# Patient Record
Sex: MALE | Race: WHITE | NOT HISPANIC OR LATINO | Employment: UNEMPLOYED | URBAN - METROPOLITAN AREA
[De-identification: names, ages, dates, MRNs, and addresses within clinical notes are randomized per-mention and may not be internally consistent; named-entity substitution may affect disease eponyms.]

---

## 2024-08-30 ENCOUNTER — TELEPHONE (OUTPATIENT)
Dept: TRANSPLANT | Facility: HOSPITAL | Age: 34
End: 2024-08-30

## 2024-09-03 ENCOUNTER — APPOINTMENT (OUTPATIENT)
Dept: CARDIOLOGY | Facility: CLINIC | Age: 34
End: 2024-09-03

## 2024-09-03 VITALS
HEIGHT: 76 IN | DIASTOLIC BLOOD PRESSURE: 74 MMHG | BODY MASS INDEX: 38.36 KG/M2 | HEART RATE: 67 BPM | SYSTOLIC BLOOD PRESSURE: 115 MMHG | WEIGHT: 315 LBS | OXYGEN SATURATION: 98 %

## 2024-09-03 DIAGNOSIS — I42.8 NONISCHEMIC CARDIOMYOPATHY (MULTI): ICD-10-CM

## 2024-09-03 DIAGNOSIS — I50.20 ACC/AHA STAGE C SYSTOLIC HEART FAILURE (MULTI): Primary | ICD-10-CM

## 2024-09-03 DIAGNOSIS — K76.0 NAFLD (NONALCOHOLIC FATTY LIVER DISEASE): ICD-10-CM

## 2024-09-03 DIAGNOSIS — E66.01 MORBID OBESITY WITH BMI OF 45.0-49.9, ADULT (MULTI): ICD-10-CM

## 2024-09-03 PROCEDURE — 1036F TOBACCO NON-USER: CPT | Performed by: INTERNAL MEDICINE

## 2024-09-03 PROCEDURE — 3008F BODY MASS INDEX DOCD: CPT | Performed by: INTERNAL MEDICINE

## 2024-09-03 PROCEDURE — 99205 OFFICE O/P NEW HI 60 MIN: CPT | Performed by: INTERNAL MEDICINE

## 2024-09-03 RX ORDER — METOPROLOL SUCCINATE 25 MG/1
25 TABLET, EXTENDED RELEASE ORAL DAILY
COMMUNITY

## 2024-09-03 RX ORDER — ESOMEPRAZOLE MAGNESIUM 40 MG/1
40 CAPSULE, DELAYED RELEASE ORAL
COMMUNITY

## 2024-09-03 RX ORDER — DAPAGLIFLOZIN 10 MG/1
10 TABLET, FILM COATED ORAL EVERY 24 HOURS
COMMUNITY

## 2024-09-03 RX ORDER — ASPIRIN 81 MG/1
81 TABLET ORAL DAILY
COMMUNITY

## 2024-09-03 RX ORDER — SPIRONOLACTONE 25 MG/1
25 TABLET ORAL DAILY
COMMUNITY

## 2024-09-03 RX ORDER — FUROSEMIDE 20 MG/1
20 TABLET ORAL DAILY
COMMUNITY

## 2024-09-03 RX ORDER — SACUBITRIL AND VALSARTAN 24; 26 MG/1; MG/1
1 TABLET, FILM COATED ORAL 2 TIMES DAILY
COMMUNITY
End: 2024-09-03 | Stop reason: DRUGHIGH

## 2024-09-03 NOTE — PATIENT INSTRUCTIONS
"It was a pleasure seeing you today. Please contact myself or my team with any questions.     To reach Dr. Adhikari' office please call [phone] (Kim).   Fax: [phone]   To schedule an appointment call [phone]     If you have any questions or need cardiac medication refills, please call the Heart Failure office at [phone], option 6. You may also contact the  Heart Failure Nursing team via email at [email or web address] (Please include your name and date of birth).        1) Change lasix to \"as needed\"  2) Increase entresto to 49/51 mg twice a day  3) Labs in 7-10 days (RFP/BNP)  4) Monitor vitals (heart rate and blood pressure and call or email in 2 weeks)  5) We will refer you to cardiac rehab  6) We will schedule a cardiac MRI in 2.5 months  7) Follow up in 3 months at /[facility]  "

## 2024-09-03 NOTE — PROGRESS NOTES
[facility]  Primary Care Physician: No primary care provider on file.  Referring Provider/Cardiologist: n/a     Date of Visit: 09/03/2024  1:00 PM EDT  Location of visit: [address]     HPI:   Mr. Stuart is a 34M with a PMHx sig for obesity with suspected MAFLD who was recently diagnosed with cardiogenic shock secondary to stage C systolic HF/NICM/HFrEF currently using a WCD who presents to the Advanced Heart Failure clinic to establish care.     Recently hospitalized at multiple medical centers in [place] for cardiogenic shock. Reports he was initially evaluated and treated for possible PNA with antibiotics. He even underwent a left VATs with left wedge resection for ILD evaluation. Ultimately he had further decompensation and was life-flighted to [facility] and managed for cardiogenic shock requiring a PAC and inotrope (dobutamine) in the setting of severe biventricular failure, shock liver, and SELMA.      Upon discharge, he relocated back to [place], to be back with family.    Currently denies chest pain, palpitations, shortness of breath. Patient has complaints of dyspnea on exertion. Denies orthopnea, negative PND. No edema noted in BLE. Patient denies headaches, dizziness or recent falls. Patient has complaints of lightheadedness.       Hospitalizations: August 2024 in [place] for cardiogenic shock      PMHx:  obesity with suspected MAFLD, stage C systolic HF/NICM/HFrEF currently using a WCD    SocHx:  Moved back to [place]   Denies smoking (quit last month), denies ETOH, denies illicits (last use of cocaine 3 months, last use of marijuana last month)  Ran a [email or web address] in [place]    FamHx:  No CAD/cardiomyopathy/SCD      Current Outpatient Medications   Medication Sig Dispense Refill    aspirin 81 mg EC tablet Take 1 tablet (81 mg) by mouth once daily.      dapagliflozin propanediol (Farxiga) 10 mg Take 0.5 tablets (5 mg) by mouth once every 24  "hours.      esomeprazole (NexIUM) 40 mg DR capsule Take 1 capsule (40 mg) by mouth once daily in the morning. Take before meals. Do not open capsule.      furosemide (Lasix) 20 mg tablet Take 1 tablet (20 mg) by mouth once daily.      metoprolol succinate XL (Toprol-XL) 25 mg 24 hr tablet Take 1 tablet (25 mg) by mouth once daily. Do not crush or chew.      sacubitriL-valsartan (Entresto) 24-26 mg tablet Take 1 tablet by mouth 2 times a day.      spironolactone (Aldactone) 25 mg tablet Take 1 tablet (25 mg) by mouth once daily.       No current facility-administered medications for this visit.       No Known Allergies      Visit Vitals  /74 (BP Location: Right arm, Patient Position: Sitting)   Pulse 67   Ht 1.93 m (6' 4\")   Wt (!) 171 kg (378 lb)   SpO2 98%   BMI 46.01 kg/m²   Smoking Status Former   BSA 3.03 m²        Physical Exam:  On exam Mr. Stuart appears his stated age, is alert and oriented x3, and in no acute distress. His sclera are anicteric and his oropharynx has moist mucous membranes. His neck is supple and without thyromegaly. The JVP is ~6 cm of water above the right atrium. His cardiac exam has regular rhythm, normal S1, S2. No S3/4. There are no murmurs. His lungs are clear to auscultation bilaterally and there is no dullness to percussion. His abdomen is soft, nontender with normoactive bowel sounds. There is no HJR. The extremities are warm and without edema. The skin is dry. There is no rash present. The distal pulses are 2+ in all four extremities. His mood and affect are appropriate for todays encounter.       Cardiac Labs/Diagnostics:    ECG (9/3/24):  Sinus rhythm (HR 85), RBBB    Echo (8/19/24):  LVEF 25%; LVIDD 7.6 cm, mild MR, reduced RV function    CCTA (8/15/24):  No CAD or LV thrombus    Liver u/s (8/11/24):  Hepatomegaly with findings suggesting of steatosis vs intrinsic cellular disease      Impression/Plan:  Mr. Stuart is a 34M with a PMHx sig for obesity with suspected MAFLD who " "was recently diagnosed with cardiogenic shock secondary to stage C systolic HF/NICM/HFrEF currently using a WCD who presents to the Advanced Heart Failure clinic to establish care. At the current time he has functional class II symptoms and appears euvolemic on exam.     1) Stage C acute systolic HF/NICM/HFrEF with severe LV dysfunction (LVEF 25%; 8/2024) and associated RV dysfunction currently using a WCD  Recently hospitalized in [place] for HF-CS requiring a PAC and inotropic support (dobutamine). Echo at that time with severe BiV dysfunction (LVIDD 7.6cm). Etiology unclear (no fam hx), but had a history of substance use and was treated for \"PNA\" prior to his diagnosis (so ? myocarditis). Currently on GDMT, which will need uptitrated. Also currently using a WCD with no shocks.  -c/w metoprolol succinate 25 mg daily  -increase entresto to 49/51 mg bid  -c/w farxiga 10 mg daily, spironolactone 25 mg daily  -monitor vitals and call/email in 2 weeks  -labs in 7-10 days (RFP/BNP)  -change lasix to 20 mg daily/prn  -will obtain a cMRI in 3 months to determine role for an ICD and further evaluate for etiology  -will refer to cardiac rehab    2) Obesity with suspected MAFLD  Had shock liver, but ultrasound imaging with possible steatosis.   -will reassess once cardiac status has been optimized  -encouraged ongoing weight loss      F/U: 3-4 months at /[facility]      ____________________________________________________________  Gonzalez Adhikari DO  Section of Advanced Heart Failure and Cardiac Transplantation  Division of Cardiovascular Medicine  [facility]  [facility]  "

## 2024-09-04 PROBLEM — I42.8 NONISCHEMIC CARDIOMYOPATHY (MULTI): Status: ACTIVE | Noted: 2024-09-04

## 2024-09-04 PROBLEM — I50.20 ACC/AHA STAGE C SYSTOLIC HEART FAILURE (MULTI): Status: ACTIVE | Noted: 2024-09-04

## 2024-09-04 PROBLEM — E66.01 MORBID OBESITY WITH BMI OF 45.0-49.9, ADULT (MULTI): Status: ACTIVE | Noted: 2024-09-04

## 2024-09-04 PROBLEM — K76.0 NAFLD (NONALCOHOLIC FATTY LIVER DISEASE): Status: ACTIVE | Noted: 2024-09-04

## 2024-09-20 DIAGNOSIS — I50.20 ACC/AHA STAGE C SYSTOLIC HEART FAILURE: ICD-10-CM

## 2024-09-20 RX ORDER — FUROSEMIDE 20 MG/1
20 TABLET ORAL DAILY PRN
Qty: 30 TABLET | Refills: 11 | Status: SHIPPED | OUTPATIENT
Start: 2024-09-20 | End: 2024-09-27 | Stop reason: SINTOL

## 2024-09-20 RX ORDER — DAPAGLIFLOZIN 10 MG/1
10 TABLET, FILM COATED ORAL EVERY 24 HOURS
Qty: 30 TABLET | Refills: 11 | Status: SHIPPED | OUTPATIENT
Start: 2024-09-20

## 2024-09-20 RX ORDER — SPIRONOLACTONE 25 MG/1
25 TABLET ORAL DAILY
Qty: 30 TABLET | Refills: 11 | Status: SHIPPED | OUTPATIENT
Start: 2024-09-20

## 2024-09-20 RX ORDER — METOPROLOL SUCCINATE 25 MG/1
25 TABLET, EXTENDED RELEASE ORAL DAILY
Qty: 30 TABLET | Refills: 11 | Status: SHIPPED | OUTPATIENT
Start: 2024-09-20 | End: 2024-09-27 | Stop reason: SDUPTHER

## 2024-09-27 ENCOUNTER — OFFICE VISIT (OUTPATIENT)
Dept: CARDIOLOGY | Facility: CLINIC | Age: 34
End: 2024-09-27

## 2024-09-27 ENCOUNTER — LAB (OUTPATIENT)
Dept: LAB | Facility: LAB | Age: 34
End: 2024-09-27

## 2024-09-27 VITALS
OXYGEN SATURATION: 96 % | WEIGHT: 315 LBS | HEIGHT: 76 IN | DIASTOLIC BLOOD PRESSURE: 72 MMHG | SYSTOLIC BLOOD PRESSURE: 109 MMHG | HEART RATE: 100 BPM | BODY MASS INDEX: 38.36 KG/M2

## 2024-09-27 DIAGNOSIS — I50.20 ACC/AHA STAGE C SYSTOLIC HEART FAILURE: ICD-10-CM

## 2024-09-27 DIAGNOSIS — I42.8 NONISCHEMIC CARDIOMYOPATHY (MULTI): ICD-10-CM

## 2024-09-27 DIAGNOSIS — I42.8 NONISCHEMIC CARDIOMYOPATHY (MULTI): Primary | ICD-10-CM

## 2024-09-27 LAB
ALBUMIN SERPL BCP-MCNC: 3.7 G/DL (ref 3.4–5)
ALP SERPL-CCNC: 56 U/L (ref 33–120)
ALT SERPL W P-5'-P-CCNC: 6 U/L (ref 10–52)
ANION GAP SERPL CALC-SCNC: 18 MMOL/L (ref 10–20)
AST SERPL W P-5'-P-CCNC: 11 U/L (ref 9–39)
BILIRUB SERPL-MCNC: 1.8 MG/DL (ref 0–1.2)
BUN SERPL-MCNC: 16 MG/DL (ref 6–23)
CALCIUM SERPL-MCNC: 9.1 MG/DL (ref 8.6–10.6)
CHLORIDE SERPL-SCNC: 105 MMOL/L (ref 98–107)
CO2 SERPL-SCNC: 21 MMOL/L (ref 21–32)
CREAT SERPL-MCNC: 0.89 MG/DL (ref 0.5–1.3)
EGFRCR SERPLBLD CKD-EPI 2021: >90 ML/MIN/1.73M*2
ERYTHROCYTE [DISTWIDTH] IN BLOOD BY AUTOMATED COUNT: 17.7 % (ref 11.5–14.5)
GLUCOSE SERPL-MCNC: 138 MG/DL (ref 74–99)
HCT VFR BLD AUTO: 34.7 % (ref 41–52)
HGB BLD-MCNC: 10.5 G/DL (ref 13.5–17.5)
MCH RBC QN AUTO: 24.9 PG (ref 26–34)
MCHC RBC AUTO-ENTMCNC: 30.3 G/DL (ref 32–36)
MCV RBC AUTO: 82 FL (ref 80–100)
NRBC BLD-RTO: 0 /100 WBCS (ref 0–0)
PHOSPHATE SERPL-MCNC: 4.1 MG/DL (ref 2.5–4.9)
PLATELET # BLD AUTO: 295 X10*3/UL (ref 150–450)
POTASSIUM SERPL-SCNC: 4.6 MMOL/L (ref 3.5–5.3)
PROT SERPL-MCNC: 6.5 G/DL (ref 6.4–8.2)
RBC # BLD AUTO: 4.21 X10*6/UL (ref 4.5–5.9)
SODIUM SERPL-SCNC: 139 MMOL/L (ref 136–145)
WBC # BLD AUTO: 8.7 X10*3/UL (ref 4.4–11.3)

## 2024-09-27 PROCEDURE — 99215 OFFICE O/P EST HI 40 MIN: CPT | Performed by: INTERNAL MEDICINE

## 2024-09-27 PROCEDURE — 3008F BODY MASS INDEX DOCD: CPT | Performed by: INTERNAL MEDICINE

## 2024-09-27 PROCEDURE — 83880 ASSAY OF NATRIURETIC PEPTIDE: CPT

## 2024-09-27 PROCEDURE — 99205 OFFICE O/P NEW HI 60 MIN: CPT | Performed by: INTERNAL MEDICINE

## 2024-09-27 PROCEDURE — 80053 COMPREHEN METABOLIC PANEL: CPT

## 2024-09-27 PROCEDURE — 1036F TOBACCO NON-USER: CPT | Performed by: INTERNAL MEDICINE

## 2024-09-27 PROCEDURE — 93005 ELECTROCARDIOGRAM TRACING: CPT | Performed by: INTERNAL MEDICINE

## 2024-09-27 PROCEDURE — 84100 ASSAY OF PHOSPHORUS: CPT

## 2024-09-27 PROCEDURE — 36415 COLL VENOUS BLD VENIPUNCTURE: CPT

## 2024-09-27 PROCEDURE — 85027 COMPLETE CBC AUTOMATED: CPT

## 2024-09-27 RX ORDER — METOPROLOL SUCCINATE 25 MG/1
25 TABLET, EXTENDED RELEASE ORAL 2 TIMES DAILY
Qty: 60 TABLET | Refills: 11 | Status: SHIPPED | OUTPATIENT
Start: 2024-09-27 | End: 2025-09-27

## 2024-09-27 RX ORDER — TORSEMIDE 10 MG/1
10 TABLET ORAL DAILY
Qty: 30 TABLET | Refills: 11 | Status: SHIPPED | OUTPATIENT
Start: 2024-09-27 | End: 2025-09-27

## 2024-09-27 ASSESSMENT — ENCOUNTER SYMPTOMS
DEPRESSION: 0
LOSS OF SENSATION IN FEET: 0
OCCASIONAL FEELINGS OF UNSTEADINESS: 0

## 2024-09-27 ASSESSMENT — PAIN SCALES - GENERAL: PAINLEVEL: 0-NO PAIN

## 2024-09-27 NOTE — PROGRESS NOTES
Name : Dawit Stuart    : 1990   MRN : [id number]   ENC Date : 24     Reason for visit:     Assessment and Plan:  Chronic systolic and diastolic heart failure: Patient with severely dilated left ventricle with severe by ventricular dysfunction.  Tolerated increase in Entresto well.  Will switch Lasix to torsemide to get a more consistent absorption.  I think we can safely increase his metoprolol succinate to 25 mg twice daily today.  I am hopeful we can continue to uptitrate the beta-blocker.  It is too soon to repeat LV function assessment.  He does need laboratories to recheck renal function and electrolytes.  Agree with cardiac rehab referral.  Right bundle branch block: QRS duration is quite widened to 178 ms however it is with a right bundle appearance.  Biventricular pacing is likely less effective in this case but might still offer some benefit.  Will obviously confer with Dr. Adhikari on need for ICD and for biventricular ICD implant.  Shock liver with fatty liver disease: Recheck LFTs.  Hopefully these are trending in the right direction.  Disp: RTO in 2-3 weeks      HPI:  Patient is seen by myself for the first time today.  His care was in [place] but he relocated closer to his family here in [place].  He saw Dr. Adhikari 3 weeks ago for initial advanced heart failure intake.  Patient has significant LV systolic dysfunction with EF 25% as documented by echocardiogram along with RV dysfunction.  He briefly required dobutamine support.  He states over the last 3 weeks he continues to be short of breath and has occasional cough.  His cough is quite variable.  He tried cutting back furosemide to as needed but for the most part he uses it every day and has noticed some variability in his urine output from a day-to-day basis.      Problem List:   Patient Active Problem List   Diagnosis    ACC/AHA stage C systolic heart failure (Multi)    Nonischemic cardiomyopathy (Multi)    NAFLD  (nonalcoholic fatty liver disease)    Morbid obesity with BMI of 45.0-49.9, adult (Multi)        Meds:   Current Outpatient Medications on File Prior to Visit   Medication Sig Dispense Refill    aspirin 81 mg EC tablet Take 1 tablet (81 mg) by mouth once daily.      dapagliflozin propanediol (Farxiga) 10 mg Take 1 tablet (10 mg) by mouth once every 24 hours. 30 tablet 11    esomeprazole (NexIUM) 40 mg DR capsule Take 1 capsule (40 mg) by mouth once daily in the morning. Take before meals. Do not open capsule.      sacubitriL-valsartan (Entresto) 49-51 mg tablet Take 1 tablet by mouth 2 times a day. 60 tablet 11    spironolactone (Aldactone) 25 mg tablet Take 1 tablet (25 mg) by mouth once daily. 30 tablet 11    [DISCONTINUED] furosemide (Lasix) 20 mg tablet Take 1 tablet (20 mg) by mouth once daily as needed (for weight gain or swelling). 30 tablet 11    [DISCONTINUED] metoprolol succinate XL (Toprol-XL) 25 mg 24 hr tablet Take 1 tablet (25 mg) by mouth once daily. Do not crush or chew. 30 tablet 11     No current facility-administered medications on file prior to visit.       All: No Known Allergies    Fam Hx: No family history on file.    Soc Hx:   Social History     Socioeconomic History    Marital status: Single     Spouse name: Not on file    Number of children: Not on file    Years of education: Not on file    Highest education level: Not on file   Occupational History    Not on file   Tobacco Use    Smoking status: Former     Types: Cigarettes    Smokeless tobacco: Never   Substance and Sexual Activity    Alcohol use: Never    Drug use: Never    Sexual activity: Not on file   Other Topics Concern    Not on file   Social History Narrative    Not on file     Social Determinants of Health     Financial Resource Strain: Not on file   Food Insecurity: Not on file   Transportation Needs: Not on file   Physical Activity: Not on file   Stress: Not on file   Social Connections: Not on file   Intimate Partner Violence:  "Not on file   Housing Stability: Not on file       VS: /72 (BP Location: Right arm, Patient Position: Sitting)   Pulse 100   Ht 1.93 m (6' 4\")   Wt (!) 172 kg (380 lb)   SpO2 96%   BMI 46.26 kg/m²      Physical Exam  Vitals reviewed.   Constitutional:       Appearance: Normal appearance. He is obese.   Eyes:      Pupils: Pupils are equal, round, and reactive to light.   Neck:      Vascular: No JVD.   Cardiovascular:      Rate and Rhythm: Regular rhythm. Tachycardia present.      Pulses: Normal pulses.      Heart sounds: No murmur heard.     No gallop.   Pulmonary:      Effort: No respiratory distress.      Breath sounds: No wheezing or rales.   Abdominal:      General: Abdomen is flat. There is no distension.      Palpations: Abdomen is soft.   Musculoskeletal:         General: No swelling.      Right lower leg: No edema.      Left lower leg: No edema.   Neurological:      General: No focal deficit present.      Mental Status: He is alert.   Psychiatric:         Mood and Affect: Mood normal.          ECG: Mild sinus tachycardia.  Right bundle branch block.  QRS duration of 178 ms      Bertin Husain MD   "

## 2024-09-28 LAB — BNP SERPL-MCNC: 1239 PG/ML (ref 0–99)

## 2024-10-01 ENCOUNTER — TELEPHONE (OUTPATIENT)
Dept: CARDIOLOGY | Facility: HOSPITAL | Age: 34
End: 2024-10-01

## 2024-10-01 NOTE — TELEPHONE ENCOUNTER
Please let patient know his labs look quite good.  His liver function tests have nearly normalized.  His AST and ALT are perfectly fine.  His bilirubin is only mildly elevated.  Kidney function is excellent.  Potassium level perfect.    Is patient tolerating higher dose of metoprolol?    SDH

## 2024-10-02 LAB
ATRIAL RATE: 113 BPM
P AXIS: 42 DEGREES
P OFFSET: 196 MS
P ONSET: 156 MS
PR INTERVAL: 118 MS
Q ONSET: 215 MS
QRS COUNT: 19 BEATS
QRS DURATION: 172 MS
QT INTERVAL: 400 MS
QTC CALCULATION(BAZETT): 548 MS
QTC FREDERICIA: 494 MS
R AXIS: -33 DEGREES
T AXIS: 12 DEGREES
T OFFSET: 415 MS
VENTRICULAR RATE: 113 BPM

## 2024-10-18 ENCOUNTER — OFFICE VISIT (OUTPATIENT)
Dept: CARDIOLOGY | Facility: CLINIC | Age: 34
End: 2024-10-18

## 2024-10-18 VITALS
HEART RATE: 103 BPM | DIASTOLIC BLOOD PRESSURE: 73 MMHG | BODY MASS INDEX: 38.36 KG/M2 | OXYGEN SATURATION: 97 % | SYSTOLIC BLOOD PRESSURE: 103 MMHG | HEIGHT: 76 IN | WEIGHT: 315 LBS

## 2024-10-18 DIAGNOSIS — I50.20 ACC/AHA STAGE C SYSTOLIC HEART FAILURE: ICD-10-CM

## 2024-10-18 DIAGNOSIS — I42.8 NONISCHEMIC CARDIOMYOPATHY (MULTI): ICD-10-CM

## 2024-10-18 PROCEDURE — 3008F BODY MASS INDEX DOCD: CPT | Performed by: INTERNAL MEDICINE

## 2024-10-18 PROCEDURE — 99214 OFFICE O/P EST MOD 30 MIN: CPT | Performed by: INTERNAL MEDICINE

## 2024-10-18 PROCEDURE — 1036F TOBACCO NON-USER: CPT | Performed by: INTERNAL MEDICINE

## 2024-10-18 RX ORDER — TORSEMIDE 10 MG/1
20 TABLET ORAL DAILY
Qty: 180 TABLET | Refills: 3 | Status: SHIPPED | OUTPATIENT
Start: 2024-10-18 | End: 2025-10-18

## 2024-10-18 RX ORDER — DAPAGLIFLOZIN 10 MG/1
10 TABLET, FILM COATED ORAL EVERY 24 HOURS
Qty: 30 TABLET | Refills: 11 | Status: SHIPPED | OUTPATIENT
Start: 2024-10-18

## 2024-10-18 RX ORDER — LOSARTAN POTASSIUM 100 MG/1
100 TABLET ORAL DAILY
Qty: 30 TABLET | Refills: 11 | Status: SHIPPED | OUTPATIENT
Start: 2024-10-18 | End: 2025-10-18

## 2024-10-18 ASSESSMENT — PAIN SCALES - GENERAL: PAINLEVEL_OUTOF10: 0-NO PAIN

## 2024-10-18 NOTE — PROGRESS NOTES
Name : Dawit Staurt   : 1990   MRN : [id number]   ENC Date : 10/18/2024      Assessment and Plan:  Chronic systolic heart failure: Patient tolerated increase in metoprolol reasonably well.  He still has orthopnea and PND as well as some lower extremity edema.  I increased his torsemide to 20 mg daily and instructed him on how he can adjust between 10 and 20 mg daily.  No other medication changes today.  I am hopeful we can further increase his metoprolol when I see him back in a few weeks.  We will repeat CMP and BNP prior to return.  Too soon to repeat echocardiogram.  Continue LifeVest.  Acute liver injury: Most recent LFTs actually looked quite good.  Bilirubin was still mildly increased most consistent with passive liver congestion but the remainder of his LFTs were unremarkable.  Disp: RTO in 4 weeks    HPI:  Patient returns today having tolerated the increase in metoprolol reasonably well.  He has many good days but there are some days where he drinks an excessive amount of fluid and feels short of breath the next day.  He still has some periods of orthopnea and PND but no syncopal events.  No chest pain.  No LifeVest shocks.    Problem list overview:   Patient Active Problem List   Diagnosis    ACC/AHA stage C systolic heart failure    Nonischemic cardiomyopathy (Multi)    NAFLD (nonalcoholic fatty liver disease)    Morbid obesity with BMI of 45.0-49.9, adult (Multi)       Meds:   Current Outpatient Medications on File Prior to Visit   Medication Sig Dispense Refill    aspirin 81 mg EC tablet Take 1 tablet (81 mg) by mouth once daily.      esomeprazole (NexIUM) 40 mg DR capsule Take 1 capsule (40 mg) by mouth once daily in the morning. Take before meals. Do not open capsule.      metoprolol succinate XL (Toprol-XL) 25 mg 24 hr tablet Take 1 tablet (25 mg) by mouth 2 times a day. Do not crush or chew. 60 tablet 11    spironolactone (Aldactone) 25 mg tablet Take 1 tablet (25 mg) by mouth once daily. 30  "tablet 11    [DISCONTINUED] dapagliflozin propanediol (Farxiga) 10 mg Take 1 tablet (10 mg) by mouth once every 24 hours. 30 tablet 11    [DISCONTINUED] sacubitriL-valsartan (Entresto) 49-51 mg tablet Take 1 tablet by mouth 2 times a day. 60 tablet 11    [DISCONTINUED] torsemide (Demadex) 10 mg tablet Take 1 tablet (10 mg) by mouth once daily. 30 tablet 11     No current facility-administered medications on file prior to visit.        VS:  /73 (BP Location: Right arm, Patient Position: Sitting, BP Cuff Size: Large adult)   Pulse 103   Ht 1.93 m (6' 4\")   Wt (!) 174 kg (384 lb)   SpO2 97%   BMI 46.74 kg/m²     Vitals reviewed.   Constitutional:       Appearance: Obese.   Neck:      Vascular: No JVD.   Pulmonary:      Effort: Pulmonary effort is normal.      Breath sounds: Normal breath sounds.   Cardiovascular:      Normal rate. Regular rhythm.      Murmurs: There is no murmur.      No gallop.    Pulses:     Intact distal pulses.   Edema:     Peripheral edema present.     Ankle: bilateral 1+ edema of the ankle.     Feet: bilateral 1+ edema of the feet.  Abdominal:      General: Abdomen is flat.      Palpations: Abdomen is soft.   Neurological:      General: No focal deficit present.      Mental Status: Alert.   Psychiatric:         Mood and Affect: Mood normal.         Bertin Husain MD  "

## 2024-11-22 ENCOUNTER — OFFICE VISIT (OUTPATIENT)
Dept: CARDIOLOGY | Facility: CLINIC | Age: 34
End: 2024-11-22

## 2024-11-22 VITALS
BODY MASS INDEX: 36.45 KG/M2 | HEART RATE: 80 BPM | DIASTOLIC BLOOD PRESSURE: 67 MMHG | WEIGHT: 315 LBS | HEIGHT: 78 IN | SYSTOLIC BLOOD PRESSURE: 112 MMHG | OXYGEN SATURATION: 97 %

## 2024-11-22 DIAGNOSIS — I42.8 NONISCHEMIC CARDIOMYOPATHY (MULTI): ICD-10-CM

## 2024-11-22 DIAGNOSIS — I50.20 ACC/AHA STAGE C SYSTOLIC HEART FAILURE: ICD-10-CM

## 2024-11-22 PROCEDURE — 99214 OFFICE O/P EST MOD 30 MIN: CPT | Performed by: INTERNAL MEDICINE

## 2024-11-22 PROCEDURE — 3008F BODY MASS INDEX DOCD: CPT | Performed by: INTERNAL MEDICINE

## 2024-11-22 PROCEDURE — 1036F TOBACCO NON-USER: CPT | Performed by: INTERNAL MEDICINE

## 2024-11-22 RX ORDER — LOSARTAN POTASSIUM 100 MG/1
100 TABLET ORAL DAILY
Qty: 30 TABLET | Refills: 11 | Status: SHIPPED | OUTPATIENT
Start: 2024-11-22 | End: 2025-11-22

## 2024-11-22 RX ORDER — TORSEMIDE 10 MG/1
20 TABLET ORAL DAILY
Qty: 60 TABLET | Refills: 11 | Status: SHIPPED | OUTPATIENT
Start: 2024-11-22 | End: 2025-11-22

## 2024-11-22 RX ORDER — METOPROLOL SUCCINATE 50 MG/1
50 TABLET, EXTENDED RELEASE ORAL 2 TIMES DAILY
Qty: 60 TABLET | Refills: 11 | Status: SHIPPED | OUTPATIENT
Start: 2024-11-22 | End: 2025-11-22

## 2024-11-22 ASSESSMENT — ENCOUNTER SYMPTOMS
DEPRESSION: 0
OCCASIONAL FEELINGS OF UNSTEADINESS: 0
LOSS OF SENSATION IN FEET: 0

## 2024-11-22 ASSESSMENT — PAIN SCALES - GENERAL: PAINLEVEL_OUTOF10: 0-NO PAIN

## 2024-11-22 NOTE — PROGRESS NOTES
Name : Dawit Stuart   : 1990   MRN : [id number]   ENC Date : 2024      Assessment and Plan:  Chronic systolic heart failure: Unfortunately patient has lost his insurance.  He cannot afford Farxiga.  We already switched him from Entresto to losartan.  He actually is feeling quite well.  I think we have the opportunity to push his metoprolol succinate higher.  I increased him to 50 mg in the morning and 25 mg at night for 2 weeks and then he will go to 50 mg twice daily.  Continue torsemide and spironolactone at current doses.   Disp: Follow-up with Dr. Adhikari in early December and myself in January.    HPI:  Patient returns today feeling much better than he did when I last saw him.  He visibly looks considerably better and thinner.  He is lost quite a bit of weight.  He is no longer wearing his LifeVest as it was alarming frequently and he simply does not want to wear it any longer.  He also unfortunately lost his medical insurance but likely will have new coverage in January.  He is unable to afford Farxiga or Entresto.  We had already switched him to losartan prior.  No lightheadedness nor dizziness.  No syncopal events.  No palpitations.    Problem list overview:   Patient Active Problem List   Diagnosis    ACC/AHA stage C systolic heart failure    Nonischemic cardiomyopathy (Multi)    NAFLD (nonalcoholic fatty liver disease)    Morbid obesity with BMI of 45.0-49.9, adult (Multi)       Meds:   Current Outpatient Medications on File Prior to Visit   Medication Sig Dispense Refill    aspirin 81 mg EC tablet Take 1 tablet (81 mg) by mouth once daily.      esomeprazole (NexIUM) 40 mg DR capsule Take 1 capsule (40 mg) by mouth once daily in the morning. Take before meals. Do not open capsule.      spironolactone (Aldactone) 25 mg tablet Take 1 tablet (25 mg) by mouth once daily. 30 tablet 11    [DISCONTINUED] losartan (Cozaar) 100 mg tablet Take 1 tablet (100 mg) by mouth once daily. 30 tablet 11     "[DISCONTINUED] metoprolol succinate XL (Toprol-XL) 25 mg 24 hr tablet Take 1 tablet (25 mg) by mouth 2 times a day. Do not crush or chew. 60 tablet 11    [DISCONTINUED] torsemide (Demadex) 10 mg tablet Take 2 tablets (20 mg) by mouth once daily. 180 tablet 3    [DISCONTINUED] dapagliflozin propanediol (Farxiga) 10 mg Take 1 tablet (10 mg) by mouth once every 24 hours. (Patient not taking: Reported on 11/22/2024) 30 tablet 11     No current facility-administered medications on file prior to visit.        VS:  /67 (BP Location: Left arm, Patient Position: Sitting)   Pulse 80   Ht 2.007 m (6' 7\")   Wt (!) 165 kg (364 lb)   SpO2 97%   BMI 41.01 kg/m²     Vitals reviewed.   Neck:      Vascular: No JVD.   Pulmonary:      Effort: Pulmonary effort is normal.      Breath sounds: Normal breath sounds.   Cardiovascular:      Normal rate. Regular rhythm.      Murmurs: There is no murmur.      No gallop.    Pulses:     Intact distal pulses.   Edema:     Peripheral edema present.     Ankle: bilateral 1+ edema of the ankle.     Feet: bilateral 1+ edema of the feet.  Abdominal:      General: Abdomen is flat.      Palpations: Abdomen is soft.   Neurological:      General: No focal deficit present.      Mental Status: Alert.   Psychiatric:         Mood and Affect: Mood normal.         Bertin Husain MD  "

## 2024-12-09 ENCOUNTER — APPOINTMENT (OUTPATIENT)
Dept: CARDIOLOGY | Facility: CLINIC | Age: 34
End: 2024-12-09

## 2024-12-09 VITALS
SYSTOLIC BLOOD PRESSURE: 112 MMHG | DIASTOLIC BLOOD PRESSURE: 79 MMHG | HEIGHT: 78 IN | WEIGHT: 315 LBS | HEART RATE: 109 BPM | OXYGEN SATURATION: 97 % | BODY MASS INDEX: 36.45 KG/M2

## 2024-12-09 DIAGNOSIS — I50.20 ACC/AHA STAGE C SYSTOLIC HEART FAILURE: Primary | ICD-10-CM

## 2024-12-09 DIAGNOSIS — I42.8 NONISCHEMIC CARDIOMYOPATHY (MULTI): ICD-10-CM

## 2024-12-09 PROCEDURE — 99213 OFFICE O/P EST LOW 20 MIN: CPT | Performed by: INTERNAL MEDICINE

## 2024-12-09 PROCEDURE — 3008F BODY MASS INDEX DOCD: CPT | Performed by: INTERNAL MEDICINE

## 2024-12-09 PROCEDURE — 1036F TOBACCO NON-USER: CPT | Performed by: INTERNAL MEDICINE

## 2024-12-09 NOTE — PATIENT INSTRUCTIONS
It was a pleasure seeing you today. Please contact myself or my team with any questions.     To reach Dr. Adhikari' office please call [phone] (Kim).   Fax: [phone]   To schedule an appointment call [phone]     If you have any questions or need cardiac medication refills, please call the Heart Failure office at [phone], option [phone]. You may also contact the  Heart Failure Nursing team via email at [email or web address] (Please include your name and date of birth).        1) Let us know when your new insurance kicks in and we will switch over your medications.   2) We will get the cardiac MRI and labs once we get your meds scheduled  3) Follow up in 4 months at /[facility]

## 2024-12-09 NOTE — PROGRESS NOTES
[facility]  Primary Care Physician: No primary care provider on file.  Referring Provider/Cardiologist: n/a     Date of Visit: 12/09/2024  1:00 PM EST  Location of visit: [address]     HPI:   Mr. Stuart is a 34M with a PMHx sig for obesity with suspected MAFLD and stage C systolic HF/NICM/HFrEF who returns to the Advanced Heart Failure clinic for ongoing evaluation and management.      Interval hx:  He lost his insurance and had to come off of several of his medications including his ARNi and SGLT2. He will be starting a new job next week and believes he will be able to resume them once he starts. He stopped wearing his WCD.     At the current time he denies chest pain, palpitations. He endorses mild exertional dyspnea. No edema noted in BLE. He denies headaches, dizziness or recent falls.     Hospitalizations: Denies       PMHx:  obesity with suspected MAFLD, stage C systolic HF/NICM/HFrEF     SocHx:  Lives in [place]   Former smoker, denies ETOH, denies illicits (previously used cocaine and MJ)    FamHx:  No CAD/cardiomyopathy/SCD      Current Outpatient Medications   Medication Sig Dispense Refill    aspirin 81 mg EC tablet Take 1 tablet (81 mg) by mouth once daily.      esomeprazole (NexIUM) 40 mg DR capsule Take 1 capsule (40 mg) by mouth once daily in the morning. Take before meals. Do not open capsule.      losartan (Cozaar) 100 mg tablet Take 1 tablet (100 mg) by mouth once daily. 30 tablet 11    metoprolol succinate XL (Toprol-XL) 50 mg 24 hr tablet Take 1 tablet (50 mg) by mouth 2 times a day. Do not crush or chew. 60 tablet 11    spironolactone (Aldactone) 25 mg tablet Take 1 tablet (25 mg) by mouth once daily. 30 tablet 11    torsemide (Demadex) 10 mg tablet Take 2 tablets (20 mg) by mouth once daily. 60 tablet 11     No current facility-administered medications for this visit.       No Known Allergies      Visit Vitals  /79 (BP Location:  "Left arm, Patient Position: Sitting)   Pulse 109   Ht 2.007 m (6' 7\")   Wt (!) 166 kg (365 lb)   SpO2 97%   BMI 41.12 kg/m²   Smoking Status Former   BSA 3.04 m²        Physical Exam:  On exam Mr. Stuart appears his stated age, is alert and oriented x3, and in no acute distress. His sclera are anicteric and his oropharynx has moist mucous membranes. His neck is supple and without thyromegaly. The JVP is ~6 cm of water above the right atrium. His cardiac exam has regular rhythm, normal S1, S2. No S3/4. There are no murmurs. His lungs are clear to auscultation bilaterally and there is no dullness to percussion. His abdomen is soft, nontender with normoactive bowel sounds. There is no HJR. The extremities are warm and without edema. The skin is dry. There is no rash present. The distal pulses are 2+ in all four extremities. His mood and affect are appropriate for todays encounter.       Cardiac Labs/Diagnostics:  ECG (9/3/24):  Sinus rhythm (HR 85), RBBB    Echo (8/19/24):  LVEF 25%; LVIDD 7.6 cm, mild MR, reduced RV function    CCTA (8/15/24):  No CAD or LV thrombus    Liver u/s (8/11/24):  Hepatomegaly with findings suggesting of steatosis vs intrinsic cellular disease      Impression/Plan:  Mr. Stuart is a 34M with a PMHx sig for obesity with suspected MAFLD and stage C systolic HF/NICM/HFrEF who returns to the Advanced Heart Failure clinic for ongoing evaluation and management. At the current time he has functional class II symptoms and appears euvolemic on exam.     1) Stage C chronic systolic HF/NICM/HFrEF with severe LV dysfunction (LVEF 25%; 8/2024) and associated RV dysfunction  Previously hospitalized in [place] for HF-CS requiring a PAC and inotropic support (dobutamine). Echo at that time with severe BiV dysfunction (LVIDD 7.6cm). Etiology unclear (no fam hx), but had a history of substance use and was treated for \"PNA\" prior to his diagnosis (so ? myocarditis). Currently on GDMT, which has been adjusted after he " lost insurance. He will be starting a new job next week where he will again have insurance; will need to resume ARNi and SGLT2. He has elected to stop wearing his WCD.   -c/w metoprolol succinate 50 mg bid, losartan 100 mg daily, spironolactone 25 mg daily, torsemide 20 mg bid  -after insurance, will discontinue losartan and resume entresto and farxiga 10 mg daily  -will repeat labs (RFP/BNP) once he is back on his optimal GDMT  -will obtain a cMRI once on OMT to determine role for an ICD and further evaluate for etiology    2) Obesity with suspected MAFLD  Had shock liver, but ultrasound imaging with possible steatosis.   -will reassess once cardiac status has been optimized  -encouraged ongoing weight loss      F/U: 4 months at /[facility]        ____________________________________________________________  Gonzalez Adhikari DO  Section of Advanced Heart Failure and Cardiac Transplantation  Division of Cardiovascular Medicine  [facility]  [facility]

## 2025-01-24 DIAGNOSIS — I50.20 ACC/AHA STAGE C SYSTOLIC HEART FAILURE: Primary | ICD-10-CM

## 2025-01-24 RX ORDER — SACUBITRIL AND VALSARTAN 49; 51 MG/1; MG/1
1 TABLET, FILM COATED ORAL 2 TIMES DAILY
Qty: 60 TABLET | Refills: 11 | Status: SHIPPED | OUTPATIENT
Start: 2025-01-24 | End: 2026-01-19

## 2025-01-24 RX ORDER — DAPAGLIFLOZIN 10 MG/1
10 TABLET, FILM COATED ORAL DAILY
Qty: 30 TABLET | Refills: 11 | Status: SHIPPED | OUTPATIENT
Start: 2025-01-24 | End: 2026-01-24

## 2025-01-24 NOTE — TELEPHONE ENCOUNTER
Patient called office stating he now has insurance coverage, and would like you to prescribe entresto and farxiga again. Do you want losartan discontinued? Do you want metoprolol dose adjusted since he is resuming farxiga? Please advise.

## 2025-02-12 ENCOUNTER — OFFICE VISIT (OUTPATIENT)
Dept: CARDIOLOGY | Facility: CLINIC | Age: 35
End: 2025-02-12
Payer: COMMERCIAL

## 2025-02-12 VITALS
BODY MASS INDEX: 36.45 KG/M2 | HEART RATE: 98 BPM | HEIGHT: 78 IN | OXYGEN SATURATION: 98 % | SYSTOLIC BLOOD PRESSURE: 106 MMHG | WEIGHT: 315 LBS | DIASTOLIC BLOOD PRESSURE: 70 MMHG

## 2025-02-12 DIAGNOSIS — I50.20 ACC/AHA STAGE C SYSTOLIC HEART FAILURE: Primary | ICD-10-CM

## 2025-02-12 PROCEDURE — 99214 OFFICE O/P EST MOD 30 MIN: CPT | Performed by: INTERNAL MEDICINE

## 2025-02-12 PROCEDURE — 3008F BODY MASS INDEX DOCD: CPT | Performed by: INTERNAL MEDICINE

## 2025-02-12 RX ORDER — METOLAZONE 5 MG/1
5 TABLET ORAL DAILY PRN
Qty: 25 TABLET | Refills: 2 | Status: SHIPPED | OUTPATIENT
Start: 2025-02-12 | End: 2026-02-12

## 2025-02-12 ASSESSMENT — PAIN SCALES - GENERAL: PAINLEVEL_OUTOF10: 0-NO PAIN

## 2025-02-13 LAB
ANION GAP SERPL CALCULATED.4IONS-SCNC: 7 MMOL/L (CALC) (ref 7–17)
BUN SERPL-MCNC: 19 MG/DL (ref 7–25)
BUN/CREAT SERPL: ABNORMAL (CALC) (ref 6–22)
CALCIUM SERPL-MCNC: 9.6 MG/DL (ref 8.6–10.3)
CHLORIDE SERPL-SCNC: 100 MMOL/L (ref 98–110)
CO2 SERPL-SCNC: 28 MMOL/L (ref 20–32)
CREAT SERPL-MCNC: 0.93 MG/DL (ref 0.6–1.26)
EGFRCR SERPLBLD CKD-EPI 2021: 109 ML/MIN/1.73M2
ERYTHROCYTE [DISTWIDTH] IN BLOOD BY AUTOMATED COUNT: 18.8 % (ref 11–15)
GLUCOSE SERPL-MCNC: 81 MG/DL (ref 65–99)
HCT VFR BLD AUTO: 34.7 % (ref 38.5–50)
HGB BLD-MCNC: 9.7 G/DL (ref 13.2–17.1)
MCH RBC QN AUTO: 20.2 PG (ref 27–33)
MCHC RBC AUTO-ENTMCNC: 28 G/DL (ref 32–36)
MCV RBC AUTO: 72.1 FL (ref 80–100)
PLATELET # BLD AUTO: 337 THOUSAND/UL (ref 140–400)
PMV BLD REES-ECKER: 9.9 FL (ref 7.5–12.5)
POTASSIUM SERPL-SCNC: 5.4 MMOL/L (ref 3.5–5.3)
RBC # BLD AUTO: 4.81 MILLION/UL (ref 4.2–5.8)
SODIUM SERPL-SCNC: 135 MMOL/L (ref 135–146)
WBC # BLD AUTO: 6.4 THOUSAND/UL (ref 3.8–10.8)

## 2025-02-13 NOTE — PROGRESS NOTES
Name : Dawit Stuart   : 1990   MRN : [id number]   ENC Date : 2025      Assessment and Plan:  Chronic HFrEF: Patient looks dramatically improved from when I first began seeing him.  He looks well-perfused and significantly improved.  Still has some volume on board.  Instead of increasing his torsemide I recommended he try using metolazone 5 mg on an as-needed basis perhaps once per week to try to maintain euvolemia while the rest of his medications continue to work.  Patient was agreeable with that plan.  Recommend no other medication changes.  He is on good doses of beta-blocker and Entresto as well as SGLT-2 inhibitor therapy.  Plan is for cardiac MRI in March to reassess LV function and determine whether or not ICD implant would be indicated.  Patient is also overdue for routine laboratories.  Will get those checked today.  Disp: RTO after cardiac MRI, phone follow-up with laboratories    HPI:  Patient returns to see me for the first time in a few months.  He lost insurance briefly and was off his medications for short period time and then was able to resume them in January.  He looks quite good today.  He is working.  He has mild to moderate dyspnea with exertion but no orthopnea nor PND.  He has noted his weight is gone up a little bit as he has been a little bit less compliant with sodium intake in his lower EXTR edema has worsened somewhat but his abdominal girth has gone down.  No chest pain no palpitations no syncopal events.    Problem list overview:   Patient Active Problem List   Diagnosis    ACC/AHA stage C systolic heart failure    Nonischemic cardiomyopathy (Multi)    NAFLD (nonalcoholic fatty liver disease)    Morbid obesity with BMI of 45.0-49.9, adult (Multi)       Meds:   Current Outpatient Medications on File Prior to Visit   Medication Sig Dispense Refill    aspirin 81 mg EC tablet Take 1 tablet (81 mg) by mouth once daily.      dapagliflozin propanediol (Farxiga) 10 mg Take 1 tablet  "(10 mg) by mouth once daily. 30 tablet 11    esomeprazole (NexIUM) 40 mg DR capsule Take 1 capsule (40 mg) by mouth once daily in the morning. Take before meals. Do not open capsule.      metoprolol succinate XL (Toprol-XL) 50 mg 24 hr tablet Take 1 tablet (50 mg) by mouth 2 times a day. Do not crush or chew. 60 tablet 11    sacubitriL-valsartan (Entresto) 49-51 mg tablet Take 1 tablet by mouth 2 times a day. 60 tablet 11    spironolactone (Aldactone) 25 mg tablet Take 1 tablet (25 mg) by mouth once daily. 30 tablet 11    torsemide (Demadex) 10 mg tablet Take 2 tablets (20 mg) by mouth once daily. 60 tablet 11     No current facility-administered medications on file prior to visit.        VS:  /70 (BP Location: Right arm, Patient Position: Sitting)   Pulse 98   Ht 2.007 m (6' 7\")   Wt (!) 173 kg (382 lb)   SpO2 98%   BMI 43.03 kg/m²     Vitals reviewed.   Constitutional:       Appearance: Obese.   Neck:      Vascular: No JVD.   Pulmonary:      Breath sounds: Normal breath sounds.   Cardiovascular:      Normal rate. Regular rhythm.      Murmurs: There is no murmur.      No gallop.    Edema:     Peripheral edema present.     Pretibial: bilateral 1+ edema of the pretibial area.     Ankle: bilateral 2+ edema of the ankle.     Feet: bilateral 2+ edema of the feet.  Abdominal:      General: Abdomen is flat.      Palpations: Abdomen is soft.   Skin:     General: Skin is warm.             Bertin Husain MD  "

## 2025-04-09 ENCOUNTER — TELEPHONE (OUTPATIENT)
Dept: CARDIOLOGY | Facility: HOSPITAL | Age: 35
End: 2025-04-09

## 2025-04-09 ENCOUNTER — OFFICE VISIT (OUTPATIENT)
Dept: CARDIOLOGY | Facility: CLINIC | Age: 35
End: 2025-04-09
Payer: COMMERCIAL

## 2025-04-09 VITALS
WEIGHT: 315 LBS | OXYGEN SATURATION: 98 % | HEIGHT: 78 IN | HEART RATE: 76 BPM | BODY MASS INDEX: 36.45 KG/M2 | DIASTOLIC BLOOD PRESSURE: 67 MMHG | SYSTOLIC BLOOD PRESSURE: 106 MMHG

## 2025-04-09 DIAGNOSIS — I42.8 NONISCHEMIC CARDIOMYOPATHY (MULTI): Primary | ICD-10-CM

## 2025-04-09 DIAGNOSIS — D50.9 MICROCYTIC ANEMIA: ICD-10-CM

## 2025-04-09 PROBLEM — E66.01 MORBID OBESITY WITH BMI OF 45.0-49.9, ADULT (MULTI): Status: RESOLVED | Noted: 2024-09-04 | Resolved: 2025-04-09

## 2025-04-09 PROCEDURE — 99214 OFFICE O/P EST MOD 30 MIN: CPT | Performed by: INTERNAL MEDICINE

## 2025-04-09 PROCEDURE — 3008F BODY MASS INDEX DOCD: CPT | Performed by: INTERNAL MEDICINE

## 2025-04-09 NOTE — PROGRESS NOTES
Name : Dawit Stuart   : 1990   MRN : [id number]   ENC Date : 2025      Assessment and Plan:  Nonischemic cardiomyopathy, NYHA class II: Patient is doing quite well.  He has minimal congestion symptoms but still requires metolazone once weekly to maintain adequate volume status.  He also has some mild orthostatic type symptoms but no syncopal events.  Last blood work did show a microcytic anemia and this has not been rechecked so we will check laboratories today.  It is possible he is intravascular dry or he may have more anemia than we have been proceeding and especially if microcytic he may not respond until he gets adequate iron stores replaced.  No medication changes for now.  He is still due to have cardiac MRI repeated to reassess LV function and look for any infiltrative disease.  We will try to get this scheduled and determine follow-up after that.  Microcytic anemia: As above.  Typically heart failure patients require IV replacement but he has never tried oral and he clinically is much improved so he might benefit from oral replacement should his iron stores come back low.  Disp: As above    HPI:  Patient returns today doing reasonably well.  He has been having some orthostatic symptoms that we were dealing with via [employer] messages.  Actually feels a little bit better over the last week or 2.  He is currently using metolazone approximately once every 7 days with nice effect.  No actual syncopal events.  He denies any bleeding events.  Laboratories in February did show a persistent anemia with a new microcytic component to it.  Other cell lines were fine.    Problem list overview:   Patient Active Problem List   Diagnosis    ACC/AHA stage C systolic heart failure    Nonischemic cardiomyopathy (Multi)    NAFLD (nonalcoholic fatty liver disease)       Meds:   Current Outpatient Medications on File Prior to Visit   Medication Sig Dispense Refill    aspirin 81 mg EC tablet Take 1 tablet (81 mg) by  "mouth once daily.      dapagliflozin propanediol (Farxiga) 10 mg Take 1 tablet (10 mg) by mouth once daily. 30 tablet 11    esomeprazole (NexIUM) 40 mg DR capsule Take 1 capsule (40 mg) by mouth once daily in the morning. Take before meals. Do not open capsule.      metOLazone (Zaroxolyn) 5 mg tablet Take 1 tablet (5 mg) by mouth once daily as needed (edema). 25 tablet 2    metoprolol succinate XL (Toprol-XL) 50 mg 24 hr tablet Take 1 tablet (50 mg) by mouth 2 times a day. Do not crush or chew. 60 tablet 11    sacubitriL-valsartan (Entresto) 49-51 mg tablet Take 1 tablet by mouth 2 times a day. 60 tablet 11    spironolactone (Aldactone) 25 mg tablet Take 1 tablet (25 mg) by mouth once daily. 30 tablet 11    torsemide (Demadex) 10 mg tablet Take 2 tablets (20 mg) by mouth once daily. 60 tablet 11     No current facility-administered medications on file prior to visit.        VS:  /67 (BP Location: Left arm, Patient Position: Sitting)   Pulse 76   Ht 2.007 m (6' 7\")   Wt (!) 168 kg (371 lb)   SpO2 98%   BMI 41.79 kg/m²     Vitals reviewed.   Constitutional:       Appearance: Obese.   Neck:      Vascular: No JVD.   Pulmonary:      Breath sounds: Normal breath sounds.   Cardiovascular:      Normal rate. Regular rhythm.      Murmurs: There is no murmur.      No gallop.    Edema:     Peripheral edema absent.   Abdominal:      General: Abdomen is flat.      Palpations: Abdomen is soft.   Skin:     General: Skin is warm.         Bertin Husain MD  "

## 2025-04-09 NOTE — TELEPHONE ENCOUNTER
Miri, please help schedule patient's previously ordered cardiac MRI at main campus.  Please let me know if he needs a new order.    SDH

## 2025-04-10 DIAGNOSIS — D50.9 IRON DEFICIENCY ANEMIA, UNSPECIFIED IRON DEFICIENCY ANEMIA TYPE: Primary | ICD-10-CM

## 2025-04-10 LAB
ALBUMIN SERPL-MCNC: 4.3 G/DL (ref 3.6–5.1)
ALP SERPL-CCNC: 73 U/L (ref 36–130)
ALT SERPL-CCNC: 13 U/L (ref 9–46)
ANION GAP SERPL CALCULATED.4IONS-SCNC: 9 MMOL/L (CALC) (ref 7–17)
AST SERPL-CCNC: 25 U/L (ref 10–40)
BILIRUB SERPL-MCNC: 0.5 MG/DL (ref 0.2–1.2)
BUN SERPL-MCNC: 25 MG/DL (ref 7–25)
CALCIUM SERPL-MCNC: 9.3 MG/DL (ref 8.6–10.3)
CHLORIDE SERPL-SCNC: 100 MMOL/L (ref 98–110)
CHOLEST SERPL-MCNC: 145 MG/DL
CHOLEST/HDLC SERPL: 3.2 (CALC)
CO2 SERPL-SCNC: 26 MMOL/L (ref 20–32)
CREAT SERPL-MCNC: 1 MG/DL (ref 0.6–1.26)
EGFRCR SERPLBLD CKD-EPI 2021: 99 ML/MIN/1.73M2
ERYTHROCYTE [DISTWIDTH] IN BLOOD BY AUTOMATED COUNT: 20.3 % (ref 11–15)
FERRITIN SERPL-MCNC: 12 NG/ML (ref 38–380)
GLUCOSE SERPL-MCNC: 78 MG/DL (ref 65–99)
HCT VFR BLD AUTO: 38 % (ref 38.5–50)
HDLC SERPL-MCNC: 45 MG/DL
HGB BLD-MCNC: 11.4 G/DL (ref 13.2–17.1)
IRON SATN MFR SERPL: 7 % (CALC) (ref 20–48)
IRON SERPL-MCNC: 30 MCG/DL (ref 50–180)
LDLC SERPL CALC-MCNC: 86 MG/DL (CALC)
MCH RBC QN AUTO: 23.6 PG (ref 27–33)
MCHC RBC AUTO-ENTMCNC: 30 G/DL (ref 32–36)
MCV RBC AUTO: 78.7 FL (ref 80–100)
NONHDLC SERPL-MCNC: 100 MG/DL (CALC)
PLATELET # BLD AUTO: 216 THOUSAND/UL (ref 140–400)
PMV BLD REES-ECKER: 11.1 FL (ref 7.5–12.5)
POTASSIUM SERPL-SCNC: 4.9 MMOL/L (ref 3.5–5.3)
PROT SERPL-MCNC: 7.7 G/DL (ref 6.1–8.1)
RBC # BLD AUTO: 4.83 MILLION/UL (ref 4.2–5.8)
SODIUM SERPL-SCNC: 135 MMOL/L (ref 135–146)
TIBC SERPL-MCNC: 404 MCG/DL (CALC) (ref 250–425)
TRIGL SERPL-MCNC: 55 MG/DL
WBC # BLD AUTO: 7.6 THOUSAND/UL (ref 3.8–10.8)

## 2025-04-10 RX ORDER — FERROUS SULFATE 325(65) MG
TABLET ORAL
Qty: 120 TABLET | Refills: 3 | Status: SHIPPED | OUTPATIENT
Start: 2025-04-10

## 2025-04-10 NOTE — TELEPHONE ENCOUNTER
Please let patient know his laboratories confirm what I thought yesterday.  He does have an iron deficiency anemia.  Actually is already better than it was in February.  Would recommend a couple of things.  1.  I would start iron sulfate 325 mg oral twice daily daily for a month and then he can cut back to once a day.  2.  We should repeat CBC and iron studies (ferritin, iron, TIBC) in 6 weeks.  3.  We should refer to gastroenterology for consideration for endoscopy to make sure were not missing gastritis or some type of early colon issue that could have caused some GI blood loss.  It still most likely with all the blood draws he had during his prior hospitalizations he may have just gotten a bit iron deficient and has not had a chance to recover but we need to make sure he is not having some subclinical bleeding.  4.  Let him know his lipid panel looks excellent and he can fill in the values for his job form from his [employer].  5.  If he is okay with that please pend the med, labs, and GI referral  SDH

## 2025-04-16 PROBLEM — K57.92 DIVERTICULITIS: Status: ACTIVE | Noted: 2020-09-22

## 2025-04-16 PROBLEM — E66.813 CLASS 3 SEVERE OBESITY WITH SERIOUS COMORBIDITY AND BODY MASS INDEX (BMI) OF 40.0 TO 44.9 IN ADULT: Status: ACTIVE | Noted: 2025-04-16

## 2025-04-16 PROBLEM — K57.32 DIVERTICULITIS LARGE INTESTINE: Status: ACTIVE | Noted: 2020-09-22

## 2025-04-16 PROBLEM — E66.01 CLASS 3 SEVERE OBESITY WITH SERIOUS COMORBIDITY AND BODY MASS INDEX (BMI) OF 40.0 TO 44.9 IN ADULT: Status: ACTIVE | Noted: 2025-04-16

## 2025-04-18 DIAGNOSIS — I50.20 ACC/AHA STAGE C SYSTOLIC HEART FAILURE: ICD-10-CM

## 2025-04-18 DIAGNOSIS — I42.8 NONISCHEMIC CARDIOMYOPATHY (MULTI): Primary | ICD-10-CM

## 2025-04-18 RX ORDER — METOPROLOL SUCCINATE 50 MG/1
50 TABLET, EXTENDED RELEASE ORAL 2 TIMES DAILY
Qty: 180 TABLET | Refills: 4 | Status: SHIPPED | OUTPATIENT
Start: 2025-04-18 | End: 2026-04-18

## 2025-04-18 RX ORDER — SPIRONOLACTONE 25 MG/1
25 TABLET ORAL DAILY
Qty: 90 TABLET | Refills: 2 | Status: SHIPPED | OUTPATIENT
Start: 2025-04-18

## 2025-05-18 DIAGNOSIS — I50.20 ACC/AHA STAGE C SYSTOLIC HEART FAILURE: ICD-10-CM

## 2025-05-19 RX ORDER — METOLAZONE 5 MG/1
TABLET ORAL
Qty: 25 TABLET | Refills: 2 | Status: SHIPPED | OUTPATIENT
Start: 2025-05-19

## 2025-05-22 DIAGNOSIS — D50.9 IRON DEFICIENCY ANEMIA, UNSPECIFIED IRON DEFICIENCY ANEMIA TYPE: ICD-10-CM

## 2025-06-04 ENCOUNTER — HOSPITAL ENCOUNTER (OUTPATIENT)
Dept: RADIOLOGY | Facility: HOSPITAL | Age: 35
Discharge: HOME | End: 2025-06-04
Payer: COMMERCIAL

## 2025-06-04 VITALS — HEIGHT: 78 IN | BODY MASS INDEX: 36.45 KG/M2 | WEIGHT: 315 LBS

## 2025-06-04 DIAGNOSIS — I50.20 ACC/AHA STAGE C SYSTOLIC HEART FAILURE: ICD-10-CM

## 2025-06-04 PROCEDURE — A9575 INJ GADOTERATE MEGLUMI 0.1ML: HCPCS | Performed by: INTERNAL MEDICINE

## 2025-06-04 PROCEDURE — 75561 CARDIAC MRI FOR MORPH W/DYE: CPT | Performed by: INTERNAL MEDICINE

## 2025-06-04 PROCEDURE — 2550000001 HC RX 255 CONTRASTS: Performed by: INTERNAL MEDICINE

## 2025-06-04 PROCEDURE — 75561 CARDIAC MRI FOR MORPH W/DYE: CPT

## 2025-06-04 RX ORDER — GADOTERATE MEGLUMINE 376.9 MG/ML
40 INJECTION INTRAVENOUS
Status: COMPLETED | OUTPATIENT
Start: 2025-06-04 | End: 2025-06-04

## 2025-06-04 RX ADMIN — GADOTERATE MEGLUMINE 40 ML: 376.9 INJECTION INTRAVENOUS at 08:46

## 2025-06-10 ENCOUNTER — APPOINTMENT (OUTPATIENT)
Dept: CARDIOLOGY | Facility: CLINIC | Age: 35
End: 2025-06-10
Payer: COMMERCIAL

## 2025-06-10 VITALS — DIASTOLIC BLOOD PRESSURE: 77 MMHG | SYSTOLIC BLOOD PRESSURE: 119 MMHG | HEART RATE: 76 BPM

## 2025-06-10 DIAGNOSIS — K76.0 NAFLD (NONALCOHOLIC FATTY LIVER DISEASE): ICD-10-CM

## 2025-06-10 DIAGNOSIS — I50.20 ACC/AHA STAGE C SYSTOLIC HEART FAILURE: Primary | ICD-10-CM

## 2025-06-10 DIAGNOSIS — I42.8 NONISCHEMIC CARDIOMYOPATHY (MULTI): ICD-10-CM

## 2025-06-10 PROCEDURE — 99214 OFFICE O/P EST MOD 30 MIN: CPT | Performed by: INTERNAL MEDICINE

## 2025-06-10 NOTE — PROGRESS NOTES
[facility]  Primary Care Physician: No primary care provider on file.  Referring Provider/Cardiologist: n/a    Date of Visit: 06/10/2025  4:20 PM EDT  Location of visit: [address]     HPI:   Mr. Stuart is a 35M with a PMHx sig for obesity with suspected MAFLD and stage C systolic HF/NICM/HFrEF who returns to the Advanced Heart Failure clinic for ongoing evaluation and management.      Interval hx:  With his new insurance plan he resumed his GDMT. He also recently completed his cMRI which notes improved EF compared to prior imaging.     Overall, he reports feeling better. He currently denies chest pain, palpitations, shortness of breath, dyspnea on exertion, orthopnea, PND. No edema noted in BLE. He denies headaches, dizziness or recent falls.       Hospitalizations: Denies       PMHx:  obesity with suspected MAFLD, stage C systolic HF/NICM/HFmrEF     SocHx:  Lives in [place]   Former smoker, denies ETOH, denies illicits (previously used cocaine and MJ)    FamHx:  No CAD/cardiomyopathy/SCD      Current Medications[1]    Allergies[2]      Visit Vitals  /77   Pulse 76   Smoking Status Some Days        Physical Exam:  On exam Mr. Stuart appears his stated age, is alert and oriented x3, and in no acute distress. His sclera are anicteric and his oropharynx has moist mucous membranes. His neck is supple and without thyromegaly. The JVP is ~6 cm of water above the right atrium. His cardiac exam has regular rhythm, normal S1, S2. No S3/4. There are no murmurs. His lungs are clear to auscultation bilaterally and there is no dullness to percussion. His abdomen is soft, nontender with normoactive bowel sounds. There is no HJR. The extremities are warm and without edema. The skin is dry. There is no rash present. The distal pulses are 2+ in all four extremities. His mood and affect are appropriate for todays encounter.       Cardiac Labs/Diagnostics:    Lab Results    Component Value Date    CREATININE 1.00 04/09/2025    BUN 25 04/09/2025     04/09/2025    K 4.9 04/09/2025     04/09/2025    CO2 26 04/09/2025        Recent Labs     04/09/25  1620   CHOL 145   LDLCALC 86   HDL 45   TRIG 55       Recent Labs     09/27/24  1420   BNP 1,239*       cMRI 6/4/25):  1. Nonischemic dilated cardiomyopathy. Moderately dilated LV size (EDVi 131 ml/m2) and mildly reduced systolic function. Quantitative LVEF 42 %. There is global hypokinesis of the LV. There is mid myocardial scar in the basal inferior septum (< 25% myocardial thickness) and at anterior and inferior RV insertion points. Estimated LV scar size is less than 1%. Elevated ECV 31%.  2. Severely dilated RV size (EDVi 161 ml/m2) and moderately reduced systolic function. Quantitative RVEF 33 %.  3. Dilated main pulmonary artery measuring 3.2 cm. Correlate clinically with history of elevated right heart pressures.  4. There is a prominent septal bounce and septal flattening in systole suggestive of right ventricular pressure overload. Absence of respirophasic shift of the interventricular septum on real-time cine images argues against constriction physiology.  5. No evidence of myocardial inflammation/edema on T2-weighted imaging (T2 mapping, STIR).  6. There is a 1 x 1 cm well-circumscribed lesion in the liver  imaging, favor hepatic cysts.    ECG (9/3/24):  Sinus rhythm (HR 85), RBBB    Echo (8/19/24):  LVEF 25%; LVIDD 7.6 cm, mild MR, reduced RV function    CCTA (8/15/24):  No CAD or LV thrombus    Liver u/s (8/11/24):  Hepatomegaly with findings suggesting of steatosis vs intrinsic cellular disease      Impression/Plan:  Mr. Stuart is a 35M with a PMHx sig for obesity with suspected MAFLD and stage C systolic HF/NICM/HFmrEF who returns to the Advanced Heart Failure clinic for ongoing evaluation and management. At the current time he has functional class II symptoms and appears euvolemic on exam.     1) Stage C  "chronic systolic HF/NICM/HFmrEF with mild LV dysfunction (LVEF 25-->42%; 6/2025) and associated RV dysfunction  Previously hospitalized in [place] for HF-CS requiring a PAC and inotropic support (dobutamine). Echo at that time with severe BiV dysfunction (LVIDD 7.6cm). Etiology unclear (no fam hx), but had a history of substance use and was treated for \"PNA\" prior to his diagnosis (so ? myocarditis). He has new insurance and is back on his GDMT. His cMRI notes improved EF, no evidence of myocarditis (findings consistent with DCM), preserved CO/CI, and scar burden <1%.  -c/w metoprolol succinate 50 mg bid, entresto 49/51 mg bid, farxiga 10 mg daily, spironolactone 25 mg daily, torsemide 20 mg daily  -defer ICD   -will discuss genetics referral at follow up    2) Obesity with suspected MAFLD  Had shock liver, but ultrasound imaging with possible steatosis.   -will discuss imaging at follow up  -encouraged ongoing weight loss      F/U: 6 months at /[facility]      ____________________________________________________________  Gonzalez Adhikari DO  Section of Advanced Heart Failure and Cardiac Transplantation  Division of Cardiovascular Medicine  [facility]  [facility]       [1]   Current Outpatient Medications   Medication Sig Dispense Refill    aspirin 81 mg EC tablet Take 1 tablet (81 mg) by mouth once daily.      dapagliflozin propanediol (Farxiga) 10 mg Take 1 tablet (10 mg) by mouth once daily. 30 tablet 11    esomeprazole (NexIUM) 40 mg DR capsule Take 1 capsule (40 mg) by mouth once daily in the morning. Take before meals. Do not open capsule.      ferrous sulfate (Iron, ferrous sulfate,) 325 (65 Fe) mg tablet Take one tablet by mouth twice daily for 30 days. Then take one tablet by mouth daily 120 tablet 3    metOLazone (Zaroxolyn) 5 mg tablet TAKE 1 TABLET (5 MG) BY MOUTH ONCE DAILY AS NEEDED FOR EDEMA 25 tablet 2    metoprolol succinate XL (Toprol-XL) 50 mg 24 hr " tablet TAKE 1 TABLET (50 MG) BY MOUTH 2 TIMES A DAY. DO NOT CRUSH OR CHEW. 180 tablet 4    sacubitriL-valsartan (Entresto) 49-51 mg tablet Take 1 tablet by mouth 2 times a day. 60 tablet 11    spironolactone (Aldactone) 25 mg tablet TAKE 1 TABLET BY MOUTH EVERY DAY 90 tablet 2    torsemide (Demadex) 10 mg tablet Take 2 tablets (20 mg) by mouth once daily. 60 tablet 11     No current facility-administered medications for this visit.   [2] No Known Allergies

## 2025-06-10 NOTE — PATIENT INSTRUCTIONS
It was a pleasure seeing you today. Please contact myself or my team with any questions.     To reach Dr. Adhikari' office please call [phone] ([place]).   Fax: [phone]   To schedule an appointment call [phone]     If you have any questions or need cardiac medication refills, please call the Heart Failure office at [phone], option 6. You may also contact the  Heart Failure Nursing team via email at [email or web address] (Please include your name and date of birth).        1) Continue your current medications  2) Monitor your blood pressure and call/email in 1-2 weeks  3) Follow up in 6 months at /[facility]

## 2025-07-08 ENCOUNTER — APPOINTMENT (OUTPATIENT)
Dept: PRIMARY CARE | Facility: CLINIC | Age: 35
End: 2025-07-08
Payer: COMMERCIAL

## 2025-07-08 VITALS
BODY MASS INDEX: 36.45 KG/M2 | OXYGEN SATURATION: 98 % | WEIGHT: 315 LBS | HEIGHT: 78 IN | RESPIRATION RATE: 20 BRPM | SYSTOLIC BLOOD PRESSURE: 118 MMHG | DIASTOLIC BLOOD PRESSURE: 76 MMHG | HEART RATE: 78 BPM | TEMPERATURE: 98.7 F

## 2025-07-08 DIAGNOSIS — F17.200 TOBACCO USE DISORDER: ICD-10-CM

## 2025-07-08 DIAGNOSIS — K76.0 NAFLD (NONALCOHOLIC FATTY LIVER DISEASE): ICD-10-CM

## 2025-07-08 DIAGNOSIS — I42.8 NONISCHEMIC CARDIOMYOPATHY (MULTI): Primary | ICD-10-CM

## 2025-07-08 DIAGNOSIS — D50.9 IRON DEFICIENCY ANEMIA, UNSPECIFIED IRON DEFICIENCY ANEMIA TYPE: ICD-10-CM

## 2025-07-08 DIAGNOSIS — E66.813 CLASS 3 SEVERE OBESITY WITH SERIOUS COMORBIDITY AND BODY MASS INDEX (BMI) OF 40.0 TO 44.9 IN ADULT: ICD-10-CM

## 2025-07-08 PROCEDURE — 99204 OFFICE O/P NEW MOD 45 MIN: CPT

## 2025-07-08 PROCEDURE — 3008F BODY MASS INDEX DOCD: CPT

## 2025-07-08 RX ORDER — VARENICLINE TARTRATE 0.5 (11)-1
KIT ORAL
Qty: 53 EACH | Refills: 0 | Status: SHIPPED | OUTPATIENT
Start: 2025-07-08

## 2025-07-08 ASSESSMENT — PATIENT HEALTH QUESTIONNAIRE - PHQ9
2. FEELING DOWN, DEPRESSED OR HOPELESS: NOT AT ALL
SUM OF ALL RESPONSES TO PHQ9 QUESTIONS 1 AND 2: 0
1. LITTLE INTEREST OR PLEASURE IN DOING THINGS: NOT AT ALL

## 2025-07-08 NOTE — PROGRESS NOTES
Subjective   Patient ID: Silvestre Stuart is a 35 y.o. male who presents for [facility] (New patient to [facility]. No prev pcp).    HPI   History of Present Illness  The patient is a 35-year-old male who presents today to [facility].    He was diagnosed with heart failure in 08/2024, initially mistaking his symptoms for a lung issue due to his heavy smoking habit of 1 1/2 packs a day.  Patient was hospitalized for shortness of breath symptoms, was found to have ejection fraction at 25%. He is currently following with Dr. Adhikari who manages his heart failure, on Entresto, Farxiga, metoprolol, and spironolactone. He reports feeling well overall and has recently had an echo that showed an improvement in his ejection fraction to 45 percent.  Is compliant with current medication regiment, able to afford all medications.    He has reduced his smoking to 1 pack a month and has recently resumed drinking alcohol, consuming up to 4 beers a week. He has never tried Chantix but is open to it. He has used Zyns to help curb his smoking habit. He has also used nicotine gum but not patches. He has a history of nicotine addiction, including vaping and using Zyns.    He was recently diagnosed with iron deficiency anemia.  He is currently taking iron supplements.    He reports no leg swelling or excessive water retention. He has reduced his sodium intake. He rarely drinks soda. He has been making efforts to improve his health through walking, gym workouts, and playing basketball. However, he has been unable to lose weight beyond 380 pounds. He has lost 60 pounds from a previous weight of 430 pounds and aims to reach 300 pounds.  He has expressed interest in GLP-1 agents.    SOCIAL HISTORY  The patient admits to smoking 1 pack a month, down from 2 packs a day. The patient admits to drinking alcohol, consuming up to 4 beers a week. The patient also admits to occasional marijuana use.       Review of Systems    Objective   BP  "118/76   Pulse 78   Temp 37.1 °C (98.7 °F)   Resp 20   Ht (!) 1.99 m (6' 6.35\")   Wt (!) 172 kg (379 lb)   SpO2 98%   BMI 43.41 kg/m²     Physical Exam  Constitutional:       General: He is not in acute distress.     Appearance: Normal appearance. He is diaphoretic. He is not ill-appearing.   Eyes:      General: No scleral icterus.  Cardiovascular:      Rate and Rhythm: Normal rate and regular rhythm.      Heart sounds: No murmur heard.     No friction rub. No gallop.   Pulmonary:      Effort: Pulmonary effort is normal. No respiratory distress.      Breath sounds: Normal breath sounds. No wheezing, rhonchi or rales.   Musculoskeletal:      Right lower leg: No edema.      Left lower leg: No edema.   Neurological:      General: No focal deficit present.      Mental Status: He is alert and oriented to person, place, and time.   Psychiatric:         Mood and Affect: Mood normal.         Behavior: Behavior normal.         Assessment/Plan   Problem List Items Addressed This Visit           ICD-10-CM    Nonischemic cardiomyopathy (Multi) - Primary I42.8    Relevant Orders    Comprehensive Metabolic Panel    TSH with reflex to Free T4 if abnormal    CBC and Auto Differential    NAFLD (nonalcoholic fatty liver disease) K76.0    Relevant Orders    Comprehensive Metabolic Panel    TSH with reflex to Free T4 if abnormal    CBC and Auto Differential    Class 3 severe obesity with serious comorbidity and body mass index (BMI) of 40.0 to 44.9 in adult E66.813, Z68.41    Relevant Orders    Comprehensive Metabolic Panel    TSH with reflex to Free T4 if abnormal    CBC and Auto Differential    Tobacco use disorder F17.200    Relevant Medications    varenicline tartrate (Chantix Starting Month Box) 0.5 mg (11)- 1 mg (42) tablet    Iron deficiency anemia D50.9    Relevant Orders    Ferritin    Iron and TIBC     Assessment & Plan  1. Heart Failure.  - Diagnosed with heart failure in 08/2024, with an initial ejection fraction " (EF) of 25%.  - Currently on Entresto, Farxiga, metoprolol, and spironolactone. Recent EKG shows an improvement in EF to 45%.  - Reports feeling much better and has been compliant with medications.  - Advised to continue current medication regimen and follow up with cardiologist as scheduled.    2. Anemia.  - Reports recent dizziness upon standing and has been diagnosed with anemia.  - Taking iron pills, which have improved symptoms.  - Blood work will be conducted today to monitor anemia status: Iron panel.    3. Smoking Cessation.  - Has significantly reduced smoking from two packs a day to one pack a month.  - Chantix will be prescribed to assist with smoking cessation. Potential side effects, including vivid dreams and mood changes, were discussed.  - If adverse effects occur, he should discontinue the medication  And contact office.    4. Weight Management.  - Has lost 60 pounds but remains at 380 pounds.  - Interested in trying incretin for weight loss however given HFrEF diagnosis patient is likely not a candidate  - Further evaluation will be done to determine if pharmacological weight loss strategies are indicated.  -Will discuss further follow-up appointment    Follow-up  - Follow up in 1 month for an annual physical examination.       Gianni Rincon, DO       This medical note was created with the assistance of artificial intelligence (AI) for documentation purposes. The content has been reviewed and confirmed by the healthcare provider for accuracy and completeness. Patient consented to the use of audio recording and use of AI during their visit.

## 2025-07-09 LAB
ALBUMIN SERPL-MCNC: 4.7 G/DL (ref 3.6–5.1)
ALP SERPL-CCNC: 83 U/L (ref 36–130)
ALT SERPL-CCNC: 16 U/L (ref 9–46)
ANION GAP SERPL CALCULATED.4IONS-SCNC: 10 MMOL/L (CALC) (ref 7–17)
AST SERPL-CCNC: 20 U/L (ref 10–40)
BASOPHILS # BLD AUTO: 40 CELLS/UL (ref 0–200)
BASOPHILS NFR BLD AUTO: 0.5 %
BILIRUB SERPL-MCNC: 0.6 MG/DL (ref 0.2–1.2)
BUN SERPL-MCNC: 21 MG/DL (ref 7–25)
CALCIUM SERPL-MCNC: 9.9 MG/DL (ref 8.6–10.3)
CHLORIDE SERPL-SCNC: 100 MMOL/L (ref 98–110)
CO2 SERPL-SCNC: 30 MMOL/L (ref 20–32)
CREAT SERPL-MCNC: 0.97 MG/DL (ref 0.6–1.26)
EGFRCR SERPLBLD CKD-EPI 2021: 103 ML/MIN/1.73M2
EOSINOPHIL # BLD AUTO: 184 CELLS/UL (ref 15–500)
EOSINOPHIL NFR BLD AUTO: 2.3 %
ERYTHROCYTE [DISTWIDTH] IN BLOOD BY AUTOMATED COUNT: 14.9 % (ref 11–15)
FERRITIN SERPL-MCNC: 80 NG/ML (ref 38–380)
GLUCOSE SERPL-MCNC: 90 MG/DL (ref 65–99)
HCT VFR BLD AUTO: 48.7 % (ref 38.5–50)
HGB BLD-MCNC: 15.8 G/DL (ref 13.2–17.1)
IRON SATN MFR SERPL: 32 % (CALC) (ref 20–48)
IRON SERPL-MCNC: 120 MCG/DL (ref 50–180)
LYMPHOCYTES # BLD AUTO: 1288 CELLS/UL (ref 850–3900)
LYMPHOCYTES NFR BLD AUTO: 16.1 %
MCH RBC QN AUTO: 30.2 PG (ref 27–33)
MCHC RBC AUTO-ENTMCNC: 32.4 G/DL (ref 32–36)
MCV RBC AUTO: 92.9 FL (ref 80–100)
MONOCYTES # BLD AUTO: 832 CELLS/UL (ref 200–950)
MONOCYTES NFR BLD AUTO: 10.4 %
NEUTROPHILS # BLD AUTO: 5656 CELLS/UL (ref 1500–7800)
NEUTROPHILS NFR BLD AUTO: 70.7 %
PLATELET # BLD AUTO: 149 THOUSAND/UL (ref 140–400)
PMV BLD REES-ECKER: 12.1 FL (ref 7.5–12.5)
POTASSIUM SERPL-SCNC: 4.6 MMOL/L (ref 3.5–5.3)
PROT SERPL-MCNC: 8.1 G/DL (ref 6.1–8.1)
RBC # BLD AUTO: 5.24 MILLION/UL (ref 4.2–5.8)
SODIUM SERPL-SCNC: 140 MMOL/L (ref 135–146)
TIBC SERPL-MCNC: 373 MCG/DL (CALC) (ref 250–425)
TSH SERPL-ACNC: 2.01 MIU/L (ref 0.4–4.5)
WBC # BLD AUTO: 8 THOUSAND/UL (ref 3.8–10.8)

## 2025-07-10 ENCOUNTER — APPOINTMENT (OUTPATIENT)
Dept: GASTROENTEROLOGY | Facility: CLINIC | Age: 35
End: 2025-07-10
Payer: COMMERCIAL

## 2025-08-08 ENCOUNTER — APPOINTMENT (OUTPATIENT)
Dept: PRIMARY CARE | Facility: CLINIC | Age: 35
End: 2025-08-08
Payer: COMMERCIAL

## 2025-08-08 VITALS
WEIGHT: 315 LBS | TEMPERATURE: 98 F | OXYGEN SATURATION: 98 % | BODY MASS INDEX: 36.45 KG/M2 | DIASTOLIC BLOOD PRESSURE: 82 MMHG | HEART RATE: 66 BPM | RESPIRATION RATE: 18 BRPM | SYSTOLIC BLOOD PRESSURE: 122 MMHG | HEIGHT: 78 IN

## 2025-08-08 DIAGNOSIS — I50.20 ACC/AHA STAGE C SYSTOLIC HEART FAILURE: ICD-10-CM

## 2025-08-08 DIAGNOSIS — D50.9 IRON DEFICIENCY ANEMIA, UNSPECIFIED IRON DEFICIENCY ANEMIA TYPE: ICD-10-CM

## 2025-08-08 DIAGNOSIS — F17.200 TOBACCO USE DISORDER: ICD-10-CM

## 2025-08-08 DIAGNOSIS — G47.33 OSA (OBSTRUCTIVE SLEEP APNEA): Primary | ICD-10-CM

## 2025-08-08 DIAGNOSIS — K76.0 NAFLD (NONALCOHOLIC FATTY LIVER DISEASE): ICD-10-CM

## 2025-08-08 PROCEDURE — 99214 OFFICE O/P EST MOD 30 MIN: CPT

## 2025-08-08 PROCEDURE — 90677 PCV20 VACCINE IM: CPT

## 2025-08-08 PROCEDURE — 3008F BODY MASS INDEX DOCD: CPT

## 2025-08-08 PROCEDURE — 90471 IMMUNIZATION ADMIN: CPT

## 2025-08-08 PROCEDURE — 99395 PREV VISIT EST AGE 18-39: CPT

## 2025-08-08 SDOH — ECONOMIC STABILITY: GENERAL
WHICH OF THE FOLLOWING DO YOU KNOW HOW TO USE AND HAVE ACCESS TO EVERY DAY? (CHOOSE ALL THAT APPLY): DESKTOP COMPUTER, LAPTOP COMPUTER, OR TABLET WITH BROADBAND INTERNET CONNECTION;SMARTPHONE WITH CELLULAR DATA PLAN

## 2025-08-08 SDOH — ECONOMIC STABILITY: INCOME INSECURITY: IN THE LAST 12 MONTHS, WAS THERE A TIME WHEN YOU WERE NOT ABLE TO PAY THE MORTGAGE OR RENT ON TIME?: NO

## 2025-08-08 SDOH — HEALTH STABILITY: PHYSICAL HEALTH: ON AVERAGE, HOW MANY MINUTES DO YOU ENGAGE IN EXERCISE AT THIS LEVEL?: 30 MIN

## 2025-08-08 SDOH — ECONOMIC STABILITY: FOOD INSECURITY: WITHIN THE PAST 12 MONTHS, YOU WORRIED THAT YOUR FOOD WOULD RUN OUT BEFORE YOU GOT MONEY TO BUY MORE.: NEVER TRUE

## 2025-08-08 SDOH — ECONOMIC STABILITY: TRANSPORTATION INSECURITY
IN THE PAST 12 MONTHS, HAS THE LACK OF TRANSPORTATION KEPT YOU FROM MEDICAL APPOINTMENTS OR FROM GETTING MEDICATIONS?: NO

## 2025-08-08 SDOH — ECONOMIC STABILITY: FOOD INSECURITY: WITHIN THE PAST 12 MONTHS, THE FOOD YOU BOUGHT JUST DIDN'T LAST AND YOU DIDN'T HAVE MONEY TO GET MORE.: NEVER TRUE

## 2025-08-08 SDOH — HEALTH STABILITY: PHYSICAL HEALTH: ON AVERAGE, HOW MANY DAYS PER WEEK DO YOU ENGAGE IN MODERATE TO STRENUOUS EXERCISE (LIKE A BRISK WALK)?: 3 DAYS

## 2025-08-08 SDOH — ECONOMIC STABILITY: TRANSPORTATION INSECURITY
IN THE PAST 12 MONTHS, HAS LACK OF TRANSPORTATION KEPT YOU FROM MEETINGS, WORK, OR FROM GETTING THINGS NEEDED FOR DAILY LIVING?: NO

## 2025-08-08 ASSESSMENT — SOCIAL DETERMINANTS OF HEALTH (SDOH)
HOW OFTEN DO YOU ATTENT MEETINGS OF THE CLUB OR ORGANIZATION YOU BELONG TO?: NEVER
HOW HARD IS IT FOR YOU TO PAY FOR THE VERY BASICS LIKE FOOD, HOUSING, MEDICAL CARE, AND HEATING?: NOT HARD AT ALL
ARE YOU MARRIED, WIDOWED, DIVORCED, SEPARATED, NEVER MARRIED, OR LIVING WITH A PARTNER?: NEVER MARRIED
WITHIN THE LAST YEAR, HAVE TO BEEN RAPED OR FORCED TO HAVE ANY KIND OF SEXUAL ACTIVITY BY YOUR PARTNER OR EX-PARTNER?: NO
HOW OFTEN DO YOU GET TOGETHER WITH FRIENDS OR RELATIVES?: MORE THAN THREE TIMES A WEEK
HOW OFTEN DO YOU ATTEND CHURCH OR RELIGIOUS SERVICES?: NEVER
IN A TYPICAL WEEK, HOW MANY TIMES DO YOU TALK ON THE PHONE WITH FAMILY, FRIENDS, OR NEIGHBORS?: MORE THAN THREE TIMES A WEEK
WITHIN THE LAST YEAR, HAVE YOU BEEN KICKED, HIT, SLAPPED, OR OTHERWISE PHYSICALLY HURT BY YOUR PARTNER OR EX-PARTNER?: NO
WITHIN THE LAST YEAR, HAVE YOU BEEN AFRAID OF YOUR PARTNER OR EX-PARTNER?: NO
WITHIN THE LAST YEAR, HAVE YOU BEEN HUMILIATED OR EMOTIONALLY ABUSED IN OTHER WAYS BY YOUR PARTNER OR EX-PARTNER?: NO
IN THE PAST 12 MONTHS, HAS THE ELECTRIC, GAS, OIL, OR WATER COMPANY THREATENED TO SHUT OFF SERVICE IN YOUR HOME?: NO
DO YOU BELONG TO ANY CLUBS OR ORGANIZATIONS SUCH AS CHURCH GROUPS UNIONS, FRATERNAL OR ATHLETIC GROUPS, OR SCHOOL GROUPS?: NO

## 2025-08-08 ASSESSMENT — PATIENT HEALTH QUESTIONNAIRE - PHQ9
1. LITTLE INTEREST OR PLEASURE IN DOING THINGS: NOT AT ALL
2. FEELING DOWN, DEPRESSED OR HOPELESS: NOT AT ALL
SUM OF ALL RESPONSES TO PHQ9 QUESTIONS 1 & 2: 0

## 2025-08-08 ASSESSMENT — LIFESTYLE VARIABLES
HOW OFTEN DO YOU HAVE A DRINK CONTAINING ALCOHOL: 2-3 TIMES A WEEK
HOW MANY STANDARD DRINKS CONTAINING ALCOHOL DO YOU HAVE ON A TYPICAL DAY: 3 OR 4

## 2025-08-08 ASSESSMENT — ENCOUNTER SYMPTOMS
DEPRESSION: 0
LOSS OF SENSATION IN FEET: 0
OCCASIONAL FEELINGS OF UNSTEADINESS: 0

## 2025-08-08 NOTE — ASSESSMENT & PLAN NOTE
Has upcoming visit with GI for further evaluation, most recent LFTs within normal limits.  Discussed diet and exercise, will hopefully have aid of GLP-1/GIP agent if covered by insurance in the setting of sleep apnea

## 2025-08-08 NOTE — PROGRESS NOTES
No Subjective   Patient ID: Silvestre Stuart is a 35 y.o. male who presents for Annual Exam (He is fasting).    HPI   History of Present Illness  The patient is a 35-year-old male who presents today for an annual physical.    He reports overall good health, with the exception of one instance of lightheadedness upon standing, which has since resolved since supplementing iron. His diet could be improved, as he tends to eat late at night and consume fewer fruits and vegetables than in the past. He occasionally indulges in fast food, such as pizza, which can cause slight swelling the following day. 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  He does state that he snores, and also loudly.  His physical activity is limited to walking and occasional basketball at work. He reports no mood issues and feels happy. He also reports no concerns about his sexual health. He has scheduled a future appointment with his cardiologist. He reports no ankle swelling. He has resumed alcohol consumption, drinking 3 to 4 times a week. He has started using Chantix to quit smoking and has noticed a decrease in his urges, although he still smokes a few cigarettes. He is interested in trying medication for weight loss.      Diet: Consumes late-night meals, fewer fruits and vegetables, occasional fast food, milk, and coffee  Alcohol: Drinks 3 to 4 times a week  Tobacco: Smokes fewer cigarettes, currently using Chantix  Coffee/Tea/Caffeine-containing Drinks: Drinks coffee  Sleep: Concern for DAVID, STOP-BAN  Sexual health: No concerns    PAST SURGICAL HISTORY:  Previous lung biopsy during workup for heart failure, found to be normal    FAMILY HISTORY  The patient's mother had breast cancer and passed away from it. The patient's father is healthy but has poor vision. The patient's grandfather had bone cancer, possibly multiple myeloma. The patient's aunt passed away from an unspecified  "disease. The patient's sister is getting checked for breast cancer.       Review of Systems    Objective   /82   Pulse 66   Temp 36.7 °C (98 °F)   Resp 18   Ht (!) 1.981 m (6' 6\")   Wt (!) 177 kg (390 lb)   SpO2 98%   BMI 45.07 kg/m²     Physical Exam  Constitutional:       General: He is not in acute distress.     Appearance: He is obese. He is not ill-appearing.   HENT:      Right Ear: External ear normal.      Left Ear: External ear normal.      Nose: Nose normal. No congestion.      Mouth/Throat:      Mouth: Mucous membranes are moist.     Eyes:      General: No scleral icterus.     Extraocular Movements: Extraocular movements intact.       Cardiovascular:      Rate and Rhythm: Normal rate and regular rhythm.      Pulses: Normal pulses.      Heart sounds: Normal heart sounds. No murmur heard.     No friction rub. No gallop.   Pulmonary:      Effort: Pulmonary effort is normal.      Breath sounds: Normal breath sounds. No wheezing, rhonchi or rales.   Abdominal:      General: Abdomen is flat. There is no distension.      Palpations: Abdomen is soft. There is no mass.      Tenderness: There is no abdominal tenderness. There is no guarding.     Musculoskeletal:      Cervical back: No tenderness.      Right lower leg: No edema.      Left lower leg: No edema.      Comments: Trace bilateral pedal edema   Lymphadenopathy:      Cervical: No cervical adenopathy.     Skin:     General: Skin is warm and dry.      Findings: No rash.     Neurological:      General: No focal deficit present.      Mental Status: He is alert and oriented to person, place, and time.      Cranial Nerves: No cranial nerve deficit.     Psychiatric:         Mood and Affect: Mood normal.         Behavior: Behavior normal.         Assessment/Plan   Problem List Items Addressed This Visit           ICD-10-CM    ACC/AHA stage C systolic heart failure I50.20    Patient is tolerating all heart failure medications well no reported adverse " effects  Trace pedal edema overall appearing euvolemic         NAFLD (nonalcoholic fatty liver disease) K76.0    Has upcoming visit with GI for further evaluation, most recent LFTs within normal limits.  Discussed diet and exercise, will hopefully have aid of GLP-1/GIP agent if covered by insurance in the setting of sleep apnea         Tobacco use disorder F17.200    Has cut back in tobacco use, continue with Chantix.         Relevant Orders    Pneumococcal conjugate vaccine, 20-valent (PREVNAR 20) (Completed)    Iron deficiency anemia D50.9    Discontinue supplemental iron at this time, ferritin at normal stores.  Will recheck iron in 6 months         Relevant Orders    Iron and TIBC    Ferritin    DAVID (obstructive sleep apnea) - Primary G47.33    High index of suspicion given snoring along with body habitus.  STOP-BANG score 4  Home sleep study ordered, patient to complete as soon as he is able           Relevant Orders    Home sleep apnea test (HSAT)     Assessment & Plan  1. Annual physical.  - Blood work results are within normal limits.  - Ferritin levels have shown a significant increase, indicating an improvement in iron status.  - A home sleep study will be ordered to assess for potential sleep apnea. If positive, consideration will be given to initiating CPAP/Zepbound (tirzepatide) if covered by insurance.  - The pneumonia vaccine will be administered today.    2. Smoking cessation.  - Currently on Chantix and reports a reduction in smoking frequency.  - Physical exam findings indicate no significant issues related to smoking cessation.  - Advised to continue with the current dosage of Chantix and to follow up if there are any issues or if a prescription refill is needed.  - Prescription for Chantix will be continued as per the current regimen.    Follow-up: A follow-up visit is scheduled in 6 months, sooner based on acute concerns       Gianni Rincon, DO       This medical note was created with the  assistance of artificial intelligence (AI) for documentation purposes. The content has been reviewed and confirmed by the healthcare provider for accuracy and completeness. Patient consented to the use of audio recording and use of AI during their visit.

## 2025-08-08 NOTE — ASSESSMENT & PLAN NOTE
Discontinue supplemental iron at this time, ferritin at normal stores.  Will recheck iron in 6 months

## 2025-08-08 NOTE — ASSESSMENT & PLAN NOTE
Patient is tolerating all heart failure medications well no reported adverse effects  Trace pedal edema overall appearing euvolemic

## 2025-08-08 NOTE — ASSESSMENT & PLAN NOTE
High index of suspicion given snoring along with body habitus.  STOP-BANG score 4  Home sleep study ordered, patient to complete as soon as he is able

## 2025-08-25 DIAGNOSIS — Z72.0 TOBACCO USE: Primary | ICD-10-CM

## 2025-08-25 DIAGNOSIS — G47.33 OSA (OBSTRUCTIVE SLEEP APNEA): ICD-10-CM

## 2025-08-25 RX ORDER — VARENICLINE TARTRATE 1 MG/1
1 TABLET, FILM COATED ORAL 2 TIMES DAILY
Qty: 60 TABLET | Refills: 5 | Status: SHIPPED | OUTPATIENT
Start: 2025-08-25

## 2025-10-08 ENCOUNTER — APPOINTMENT (OUTPATIENT)
Dept: GASTROENTEROLOGY | Facility: CLINIC | Age: 35
End: 2025-10-08
Payer: COMMERCIAL

## 2025-11-04 ENCOUNTER — APPOINTMENT (OUTPATIENT)
Dept: DERMATOLOGY | Facility: CLINIC | Age: 35
End: 2025-11-04
Payer: COMMERCIAL

## 2025-12-15 ENCOUNTER — APPOINTMENT (OUTPATIENT)
Dept: CARDIOLOGY | Facility: CLINIC | Age: 35
End: 2025-12-15
Payer: COMMERCIAL

## 2026-02-10 ENCOUNTER — APPOINTMENT (OUTPATIENT)
Dept: PRIMARY CARE | Facility: CLINIC | Age: 36
End: 2026-02-10
Payer: COMMERCIAL